# Patient Record
Sex: MALE | Race: OTHER | URBAN - METROPOLITAN AREA
[De-identification: names, ages, dates, MRNs, and addresses within clinical notes are randomized per-mention and may not be internally consistent; named-entity substitution may affect disease eponyms.]

---

## 2018-07-01 ENCOUNTER — EMERGENCY (EMERGENCY)
Facility: HOSPITAL | Age: 28
LOS: 1 days | Discharge: HOME | End: 2018-07-01
Attending: EMERGENCY MEDICINE | Admitting: EMERGENCY MEDICINE
Payer: MEDICAID

## 2018-07-01 VITALS
SYSTOLIC BLOOD PRESSURE: 131 MMHG | DIASTOLIC BLOOD PRESSURE: 85 MMHG | HEART RATE: 85 BPM | OXYGEN SATURATION: 98 % | RESPIRATION RATE: 20 BRPM | TEMPERATURE: 98 F

## 2018-07-01 DIAGNOSIS — H10.31 UNSPECIFIED ACUTE CONJUNCTIVITIS, RIGHT EYE: ICD-10-CM

## 2018-07-01 DIAGNOSIS — Z79.2 LONG TERM (CURRENT) USE OF ANTIBIOTICS: ICD-10-CM

## 2018-07-01 DIAGNOSIS — H57.8 OTHER SPECIFIED DISORDERS OF EYE AND ADNEXA: ICD-10-CM

## 2018-07-01 PROCEDURE — 99282 EMERGENCY DEPT VISIT SF MDM: CPT

## 2018-07-01 RX ORDER — POLYMYXIN B SULF/TRIMETHOPRIM 10000-1/ML
1 DROPS OPHTHALMIC (EYE)
Qty: 15 | Refills: 0 | OUTPATIENT
Start: 2018-07-01 | End: 2018-07-07

## 2018-07-01 NOTE — ED PROVIDER NOTE - MEDICAL DECISION MAKING DETAILS
28yo M with conjunctival injection of right eye. no discharge at this time. no corneal abrasions. no pain with EOM. suspect conjunctivitis. will treat with Polymixin B and provide information for close f/u with ophthalmologist.   advised to wash hands well.

## 2018-07-01 NOTE — ED PROVIDER NOTE - OBJECTIVE STATEMENT
26 y/o M w/ no PMH presents w/ R eye redness x1 week w/ clear watery tears and wakes up in the morning w/ crust. He was seen by his PCP and was prescribed Cipro eye drops 5 days ago, using drops as instructed without improvement, so patient comes in for evaluation. Denies fever, chills, loss of vision, visual changes. No sick contacts. No contact lens use. 26 y/o M w/ no PMH presents w/ R eye redness x1 week w/ clear watery tears and wakes up in the morning w/ crust. He was seen by his PCP and was prescribed Cipro eye drops 5 days ago, using drops as instructed without improvement, so patient comes in for evaluation. Denies fever, chills, loss of vision, visual changes. No sick contacts. No contact lens use. No seasonal allergies.

## 2018-07-01 NOTE — ED PROVIDER NOTE - EYE, RIGHT
CONJUNCTIVA ERYTHEMA CONJUNCTIVA ERYTHEMA/pupils equal, round, and reactive to light no pain with eye movement. no FB noted when lid everted./pupils equal, round, and reactive to light/CONJUNCTIVA ERYTHEMA